# Patient Record
Sex: MALE | Race: ASIAN
[De-identification: names, ages, dates, MRNs, and addresses within clinical notes are randomized per-mention and may not be internally consistent; named-entity substitution may affect disease eponyms.]

---

## 2019-07-22 ENCOUNTER — HOSPITAL ENCOUNTER (INPATIENT)
Dept: HOSPITAL 72 - EMR | Age: 39
LOS: 2 days | Discharge: HOME | DRG: 750 | End: 2019-07-24
Payer: COMMERCIAL

## 2019-07-22 VITALS — DIASTOLIC BLOOD PRESSURE: 68 MMHG | SYSTOLIC BLOOD PRESSURE: 128 MMHG

## 2019-07-22 VITALS — DIASTOLIC BLOOD PRESSURE: 68 MMHG | SYSTOLIC BLOOD PRESSURE: 121 MMHG

## 2019-07-22 VITALS — DIASTOLIC BLOOD PRESSURE: 89 MMHG | SYSTOLIC BLOOD PRESSURE: 150 MMHG

## 2019-07-22 VITALS — BODY MASS INDEX: 40.43 KG/M2 | HEIGHT: 74 IN | WEIGHT: 315 LBS

## 2019-07-22 VITALS — SYSTOLIC BLOOD PRESSURE: 135 MMHG | DIASTOLIC BLOOD PRESSURE: 88 MMHG

## 2019-07-22 DIAGNOSIS — R79.89: ICD-10-CM

## 2019-07-22 DIAGNOSIS — F20.9: Primary | ICD-10-CM

## 2019-07-22 DIAGNOSIS — E87.6: ICD-10-CM

## 2019-07-22 DIAGNOSIS — E86.0: ICD-10-CM

## 2019-07-22 LAB
ADD MANUAL DIFF: NO
ALBUMIN SERPL-MCNC: 3.7 G/DL (ref 3.4–5)
ALBUMIN/GLOB SERPL: 0.7 {RATIO} (ref 1–2.7)
ALP SERPL-CCNC: 102 U/L (ref 46–116)
ALT SERPL-CCNC: 163 U/L (ref 12–78)
ANION GAP SERPL CALC-SCNC: 10 MMOL/L (ref 5–15)
AST SERPL-CCNC: 115 U/L (ref 15–37)
BASOPHILS NFR BLD AUTO: 0.9 % (ref 0–2)
BILIRUB DIRECT SERPL-MCNC: 0.4 MG/DL (ref 0–0.3)
BILIRUB SERPL-MCNC: 2.5 MG/DL (ref 0.2–1)
BUN SERPL-MCNC: 10 MG/DL (ref 7–18)
CALCIUM SERPL-MCNC: 9.8 MG/DL (ref 8.5–10.1)
CHLORIDE SERPL-SCNC: 103 MMOL/L (ref 98–107)
CO2 SERPL-SCNC: 28 MMOL/L (ref 21–32)
CREAT SERPL-MCNC: 1 MG/DL (ref 0.55–1.3)
EOSINOPHIL NFR BLD AUTO: 4.5 % (ref 0–3)
ERYTHROCYTE [DISTWIDTH] IN BLOOD BY AUTOMATED COUNT: 10.5 % (ref 11.6–14.8)
GLOBULIN SER-MCNC: 5.2 G/DL
HCT VFR BLD CALC: 45.2 % (ref 42–52)
HGB BLD-MCNC: 16 G/DL (ref 14.2–18)
LYMPHOCYTES NFR BLD AUTO: 19.2 % (ref 20–45)
MCV RBC AUTO: 92 FL (ref 80–99)
MONOCYTES NFR BLD AUTO: 7.5 % (ref 1–10)
NEUTROPHILS NFR BLD AUTO: 68 % (ref 45–75)
PLATELET # BLD: 333 K/UL (ref 150–450)
POTASSIUM SERPL-SCNC: 3 MMOL/L (ref 3.5–5.1)
RBC # BLD AUTO: 4.93 M/UL (ref 4.7–6.1)
SODIUM SERPL-SCNC: 140 MMOL/L (ref 136–145)
WBC # BLD AUTO: 13.4 K/UL (ref 4.8–10.8)

## 2019-07-22 PROCEDURE — 86803 HEPATITIS C AB TEST: CPT

## 2019-07-22 PROCEDURE — 85025 COMPLETE CBC W/AUTO DIFF WBC: CPT

## 2019-07-22 PROCEDURE — 80053 COMPREHEN METABOLIC PANEL: CPT

## 2019-07-22 PROCEDURE — 80076 HEPATIC FUNCTION PANEL: CPT

## 2019-07-22 PROCEDURE — 87340 HEPATITIS B SURFACE AG IA: CPT

## 2019-07-22 PROCEDURE — 71045 X-RAY EXAM CHEST 1 VIEW: CPT

## 2019-07-22 PROCEDURE — 80048 BASIC METABOLIC PNL TOTAL CA: CPT

## 2019-07-22 PROCEDURE — 86709 HEPATITIS A IGM ANTIBODY: CPT

## 2019-07-22 PROCEDURE — 80307 DRUG TEST PRSMV CHEM ANLYZR: CPT

## 2019-07-22 PROCEDURE — 82248 BILIRUBIN DIRECT: CPT

## 2019-07-22 PROCEDURE — 99285 EMERGENCY DEPT VISIT HI MDM: CPT

## 2019-07-22 PROCEDURE — 96365 THER/PROPH/DIAG IV INF INIT: CPT

## 2019-07-22 PROCEDURE — 36415 COLL VENOUS BLD VENIPUNCTURE: CPT

## 2019-07-22 PROCEDURE — 80329 ANALGESICS NON-OPIOID 1 OR 2: CPT

## 2019-07-22 PROCEDURE — 87081 CULTURE SCREEN ONLY: CPT

## 2019-07-22 PROCEDURE — 86705 HEP B CORE ANTIBODY IGM: CPT

## 2019-07-22 RX ADMIN — TRAZODONE HYDROCHLORIDE SCH MG: 100 TABLET ORAL at 21:00

## 2019-07-22 NOTE — NUR
ED Nurse Note:



ivf started per ermd ordered. will continue to monitor. pt is in room, asleep, 
easily arousable.

## 2019-07-22 NOTE — EMERGENCY ROOM REPORT
History of Present Illness


General


Chief Complaint:  General Complaint


Source:  Patient, EMS, Law Enforcement





Present Illness


HPI


39-year-old male presents ED for evaluation.  Brought in by EMS and [employer].  Was 

found wandering the streets.  Bystanders called 911.  Patient has been missing 

from his group home for the last 10 days.  Director of home stated that patient 

needs to be brought to hospital for medical clearance and evaluation.  Patient 

states he feels okay.  History of schizophrenia.  Has not been taking his 

medications for the last 10 days.  Denies SI or HI.  Denies hearing voices.  No 

other aggravating relieving factors.  Denies any other associated symptoms


Allergies:  


Coded Allergies:  


     No Known Allergies (Unverified , 7/22/19)





Patient History


Past Medical History:  psych hx


Past Surgical History:  none


Pertinent Family History:  none


Social History:  Denies: smoking, alcohol use, drug use


Immunizations:  UTD


Reviewed Nursing Documentation:  PMH: Agreed; PSxH: Agreed





Review of Systems


All Other Systems:  negative except mentioned in HPI





Physical Exam





Vital Signs








  Date Time  Temp Pulse Resp B/P (MAP) Pulse Ox O2 Delivery O2 Flow Rate FiO2


 


7/22/19 15:00 99.9 71 19 150/89 (109) 97 Room Air  








Sp02 EP Interpretation:  reviewed, normal


General Appearance:  no apparent distress, alert, GCS 15, non-toxic, obese


Head:  normocephalic, atraumatic


Eyes:  bilateral eye normal inspection, bilateral eye PERRL


ENT:  hearing grossly normal, normal pharynx, no angioedema, normal voice


Neck:  full range of motion, supple/symm/no masses


Respiratory:  chest non-tender, lungs clear, normal breath sounds, speaking 

full sentences


Cardiovascular #1:  regular rate, rhythm, no edema


Cardiovascular #2:  2+ carotid (R), 2+ carotid (L), 2+ radial (R), 2+ radial (L)

, 2+ dorsalis pedis (R), 2+ dorsalis pedis (L)


Gastrointestinal:  normal bowel sounds, non tender, soft, non-distended, no 

guarding, no rebound


Rectal:  deferred


Genitourinary:  normal inspection, no CVA tenderness


Musculoskeletal:  back normal, gait/station normal, normal range of motion, non-

tender


Neurologic:  alert, oriented x3, responsive, motor strength/tone normal, 

sensory intact, speech normal


Psychiatric:  judgement/insight normal, memory normal, mood/affect normal, no 

suicidal/homicidal ideation


Reflexes:  3+ bicep (R), 3+ bicep (L), 3+ tricep (R), 3+ tricep (L), 3+ knee (R)

, 3+ knee (L)


Lymphatic:  no adenopathy





Medical Decision Making


Diagnostic Impression:  


 Primary Impression:  


 Schizophrenia


 Qualified Codes:  F20.9 - Schizophrenia, unspecified


 Additional Impressions:  


 Elevated LFTs


 Hypokalemia


ER Course


39-year-old male referred to ED for medical clearance.  Missing from his group 

home for 10 days





differentialdehydration, psychosis, EtOH





Placed on stretcher.  After initial history and physical I ordered labs, IV 

fluids





labs No leukocytosis, hemoglobin/hematocrit stable, potassium 3.0, LFTs 

elevated.





K Repleted.  Patient denies any abdominal pain.  No overt signs of jaundice.  

Due to morbid body habitus patient would not be able to get a CT. patient has 

no abdominal pain 





director of facility stated that patient would not be accepted until he was 

cleared medically and evaluated by a psychiatrist.  No indication for transfer 

to psychiatric team.





Will be admitted to Dr. Escobar service.  psychiatry consult will be 

obtained.  





Diagnosisschizophrenia, elevated LFTs, hypokalemia





admitted to floor in serious condition





Labs








Test


  7/22/19


15:10


 


White Blood Count


  13.4 K/UL


(4.8-10.8)


 


Red Blood Count


  4.93 M/UL


(4.70-6.10)


 


Hemoglobin


  16.0 G/DL


(14.2-18.0)


 


Hematocrit


  45.2 %


(42.0-52.0)


 


Mean Corpuscular Volume 92 FL (80-99) 


 


Mean Corpuscular Hemoglobin


  32.5 PG


(27.0-31.0)


 


Mean Corpuscular Hemoglobin


Concent 35.4 G/DL


(32.0-36.0)


 


Red Cell Distribution Width


  10.5 %


(11.6-14.8)


 


Platelet Count


  333 K/UL


(150-450)


 


Mean Platelet Volume


  6.5 FL


(6.5-10.1)


 


Neutrophils (%) (Auto)


  68.0 %


(45.0-75.0)


 


Lymphocytes (%) (Auto)


  19.2 %


(20.0-45.0)


 


Monocytes (%) (Auto)


  7.5 %


(1.0-10.0)


 


Eosinophils (%) (Auto)


  4.5 %


(0.0-3.0)


 


Basophils (%) (Auto)


  0.9 %


(0.0-2.0)


 


Sodium Level


  140 MMOL/L


(136-145)


 


Potassium Level


  3.0 MMOL/L


(3.5-5.1)


 


Chloride Level


  103 MMOL/L


()


 


Carbon Dioxide Level


  28 MMOL/L


(21-32)


 


Anion Gap


  10 mmol/L


(5-15)


 


Blood Urea Nitrogen


  10 mg/dL


(7-18)


 


Creatinine


  1.0 MG/DL


(0.55-1.30)


 


Estimat Glomerular Filtration


Rate > 60 mL/min


(>60)


 


Glucose Level


  114 MG/DL


()


 


Calcium Level


  9.8 MG/DL


(8.5-10.1)


 


Total Bilirubin


  2.5 MG/DL


(0.2-1.0)


 


Direct Bilirubin


  0.4 MG/DL


(0.0-0.3)


 


Aspartate Amino Transf


(AST/SGOT) 115 U/L


(15-37)


 


Alanine Aminotransferase


(ALT/SGPT) 163 U/L


(12-78)


 


Alkaline Phosphatase


  102 U/L


()


 


Total Protein


  8.9 G/DL


(6.4-8.2)


 


Albumin


  3.7 G/DL


(3.4-5.0)


 


Globulin 5.2 g/dL 


 


Albumin/Globulin Ratio 0.7 (1.0-2.7) 


 


Salicylates Level


  < 0.2 ug/mL


(2.8-20)


 


Urine Opiates Screen


  Negative


(NEGATIVE)


 


Acetaminophen Level


  < 2 MCG/ML


(10-30)


 


Urine Barbiturates Screen


  Negative


(NEGATIVE)


 


Phencyclidine (PCP) Screen


  Negative


(NEGATIVE)


 


Urine Amphetamines Screen


  Negative


(NEGATIVE)


 


Urine Benzodiazepines Screen


  Negative


(NEGATIVE)


 


Urine Cocaine Screen


  Negative


(NEGATIVE)


 


Urine Marijuana (THC) Screen


  Negative


(NEGATIVE)


 


Serum Alcohol < 3 mg/dL 











Last Vital Signs








  Date Time  Temp Pulse Resp B/P (MAP) Pulse Ox O2 Delivery O2 Flow Rate FiO2


 


7/22/19 15:25  71 19   Room Air  


 


7/22/19 15:25 99.9   150/89 97   








Status:  improved


Disposition:  ADMITTED AS INPATIENT


Condition:  Serious


Referrals:  


NON PHYSICIAN (PCP)











Hayden Jackson MD Jul 22, 2019 17:58

## 2019-07-22 NOTE — NUR
NURSE NOTES:Patient Assisted and take  shower  tonight . And assisted back to bed . safety 
and fall precautions . Patient instructed to uses call light when needed . Patient 
verbalized his understanding bed in low position . bed alarm on will continue  to monitor.

## 2019-07-22 NOTE — NUR
ED Nurse Note:



pt was transfered to 320 1 with stable vs, all belongings endorsed to jacinto delarosa.

## 2019-07-22 NOTE — NUR
ED Nurse Note:

Pt was reported as missing person 2 wks ago and was found by IDT Team that pt 
is wandering around in the area.  Tyree Branch came to er 
with the pt, albaro and cristina guaman #[id number]. Pt is here for medical claerance.

## 2019-07-22 NOTE — NUR
NURSE NOTES:

Received pt from ED @1820, report given by Emily SHAFER. Pt awake, A/O x 2, calm, cooperative, 
homeless, overweight.  VS stable, denies pain, no SOB noted. Redness abd folds/ groin area, 
scratches on gen body, closed blister on right great toe. IVF infusing. k replaced per 
order. tolerating diet. pt dont remember home meds. belongings with pt. Swabs pending. 
received orders. bed in low position, bed alarm on. call light within reach. will continue 
to monitor.

## 2019-07-22 NOTE — NUR
NURSE NOTES:Patient refused  scheduled Trazodone 100MG PO at 21:00 pm.  Explained to patient 
the risk and benefits . patient states  he feels okay. and still refusing April VANESSA 
Notified and aware.

## 2019-07-22 NOTE — NUR
NURSE NOTES:NURSE NOTES Patient received from Connie GARG Patient denies any pain at this 
time . nos/s of distress noted . LAC G# 20  ns at 100 cc/ hr infusing well . safety / fall 
precaution . Call lihnt within reach . Bed in low position . will continue to monitor .

## 2019-07-23 VITALS — SYSTOLIC BLOOD PRESSURE: 105 MMHG | DIASTOLIC BLOOD PRESSURE: 62 MMHG

## 2019-07-23 VITALS — DIASTOLIC BLOOD PRESSURE: 97 MMHG | SYSTOLIC BLOOD PRESSURE: 149 MMHG

## 2019-07-23 VITALS — SYSTOLIC BLOOD PRESSURE: 131 MMHG | DIASTOLIC BLOOD PRESSURE: 64 MMHG

## 2019-07-23 VITALS — DIASTOLIC BLOOD PRESSURE: 84 MMHG | SYSTOLIC BLOOD PRESSURE: 133 MMHG

## 2019-07-23 VITALS — SYSTOLIC BLOOD PRESSURE: 122 MMHG | DIASTOLIC BLOOD PRESSURE: 84 MMHG

## 2019-07-23 VITALS — SYSTOLIC BLOOD PRESSURE: 129 MMHG | DIASTOLIC BLOOD PRESSURE: 62 MMHG

## 2019-07-23 RX ADMIN — ARIPIPRAZOLE SCH MG: 10 TABLET ORAL at 08:31

## 2019-07-23 RX ADMIN — BENZTROPINE MESYLATE SCH MG: 1 TABLET ORAL at 08:32

## 2019-07-23 RX ADMIN — TRAZODONE HYDROCHLORIDE SCH MG: 100 TABLET ORAL at 20:47

## 2019-07-23 NOTE — NUR
NURSE NOTES Patient  refusing blood works this morning 7/23/19 . Explained the risk and 
benefits . Patient still refusing  Julia VANESSA notified and aware. will continue to monitor .

## 2019-07-23 NOTE — NUR
NURSE NOTES:

Patient refused blood draw for stat order Hep panel 3 attempt done patient refused and got 
aggressive on the third attempt per phlebotomist Benita.

## 2019-07-23 NOTE — HISTORY AND PHYSICAL REPORT
DATE OF ADMISSION:  07/22/2019

REASON FOR ADMISSION:  Homelessness.



HISTORY OF PRESENT ILLNESS:  The patient is a 39-year-old gentleman brought

to the emergency room by EMS and [employer] after he was found wandering the

streets.  Bystanders had called 911.  The patient had been missing from

his group home for approximately 10 days.  _____ at home stated he needed

to be brought to the hospital for medical clearance and evaluation prior

to be returned to the facility.  The patient does have a history of

schizophrenia and has not been taking his medications.



PAST MEDICAL HISTORY:  Schizophrenia.



PAST SURGICAL HISTORY:  None.



ALLERGIES:  No known drug allergies.



SOCIAL HISTORY:  No tobacco, alcohol, or illicit drug use.



REVIEW OF SYSTEMS:  NEUROLOGIC:  The patient denies headache, change in

vision, syncope,  or presyncopal episodes.  CARDIOVASCULAR:  No current

chest pain, palpitations, or angina.  PULMONARY:  No difficulty breathing,

productive cough, or sputum.  GASTROINTESTINAL/GENITOURINARY:  No change

in urine or bowel habits.  No nausea, vomiting, or diarrhea.

ENDOCRINOLOGY:  No night sweats, fevers, or chills.  MUSCULOSKELETAL:  The

patient is feeling weak, tired, and fatigued.



PHYSICAL EXAMINATION:

VITAL SIGNS:  Blood pressure 121/68, respiratory rate 20, pulse 78,

temperature 97.9, and 95% oxygen saturation on room air.

GENERAL:  The patient is awake, alert, and not in distress.

HEENT:  Extraocular muscles intact.  No lymphadenopathy noted.

Oropharyngeal mucosa is clear and dry.

CARDIOVASCULAR:  S1, S2.  No rubs or gallops.  Regular rate.

PULMONARY:  Clear to auscultation bilaterally.  No rales, rhonchi or

wheezes.

ABDOMINAL:  Nondistended and nontender.

EXTREMITY:  A 1+ edema with blisters noted.



LABORATORY DATA:  Labs dated 07/22/2019, white cell count 13.4, hemoglobin

16, and platelet count 333,000.  Sodium 140, potassium 3, and creatinine

1.  AST and ALT are 115 and 163 respectively.  Albumin 3.7.  Toxicology

screen negative.



ASSESSMENT AND PLAN:

1. Prior schizophrenia, not being on any medications at this time.  The

patient is not a harm to himself.  He has no suicidal ideation or plans on

hurting himself.  At this time, Psychiatry has also been called.  We will

medically clear the patient and transfer him back to his living home.

Social Work consult has been placed.

2. Schizophrenia.  At this time, the patient is currently stable.

Psychiatry has been consulted for further evaluation and management and

re-initiation of his medications.

3. DVT prophylaxis with SCDs.









  ______________________________________________

  Jimbo Nelson MD





DR:  KRYSTAL

D:  07/22/2019 20:35

T:  07/23/2019 01:04

JOB#:  [id number]

CC:

## 2019-07-23 NOTE — NUR
CASE MANAGEMENT:REVIEW



39 YR OLD MALE BIBA FROM [facility]



SI: ENCEPHALOPATHY. HYPOKALEMIA

SCHIZOPHRENIA

99.8  71  19  150/89    97% ON RA

WBC+13.4   K-3.0   GLUCOSE+114



IS: K-DUR PO X1

IVF+KCL@100/HR

**: TO MED/SURG UNIT



7/23/19

DISCHARGE

## 2019-07-23 NOTE — DIAGNOSTIC IMAGING REPORT
Indication: Cough, employee health screening

 

Technique: One view of the chest

 

Comparison: none

 

Findings: Body habitus limits evaluation. The heart is enlarged. No definite acute

infiltrates, effusions, or congestion.

 

Impression: Cardiac megaly

 

No acute process

## 2019-07-23 NOTE — NUR
*-* INSURANCE *-*



ALL CLINICALS AND REVIEWS HAVE BEEN FAXED:



[facility] 

FAX ALL CLINICALS  [phone]

## 2019-07-23 NOTE — NUR
NURSE NOTES:

INCONTINENT OF BM. ASSISTED IN THE SHOWER.

WITH REDNESS ON THE ABDOMINAL FOLDS AND GROIN. 

SEEN BY WOUND CARE NURSE. APPLIED TRIAD CREAM TO ABD. FOLDS 

AND GROIN AS PER WOUND CARE RECOMMENDATION.

## 2019-07-23 NOTE — NUR
NURSE NOTES:

Received report from HOLLIS Denis. Patient A&Ox4. On room air, no signs of distress or labored 
breathing. IV intact, patent, and infusing IV fluids. Bed in lowest position with call light 
in reach. Patient awaiting discharge to [facility]. Will continue to 
monitor. 

-------------------------------------------------------------------------------

Addendum: 07/23/19 at 2107 by Idalia Rosado RN

-------------------------------------------------------------------------------

Patient is going to [facility] in [place], [place]

## 2019-07-23 NOTE — NUR
Social Service Note



EVETTE spoke with  Tyree Branch of [facility] [phone].  Patient is not 
homeless.  Patient has been a resident of facility for 8 years.  Patient is not conserved.  
Since patient has been missing for almost 2 weeks facility requires a chest x-ray and hep 
panel to be completed.  EVETTE informed charge nurse.  Once labs and chest x-ray completed 
information faxed to [phone].  [facility] states they are available to provide 
transportation back to facility.  Patient last wandered from facility a year ago.  Will 
monitor and follow up.

## 2019-07-23 NOTE — NUR
NURSE NOTES:

CN spoke to  Robyn regarding patient  schedule by the facility he used 
to live after 6pm, CN left message for Dr. Nelson to obtain discharge order will f/u

## 2019-07-23 NOTE — NEPHROLOGY PROGRESS NOTE
Assessment/Plan


Assessment/Plan:





A/P





1. Schizophrenia- DC patient today once cleared by PSY and social work address 

home situation


2. Dehydration- IVFs


3. DVT prophylaxis with SCDs.


4. Mild LFT elevation- ?? repeat labs pending





Subjective


Date patient seen:  Jul 23, 2019


Time patient seen:  07:38


ROS Limited/Unobtainable:  No


Allergies:  


Coded Allergies:  


     No Known Allergies (Unverified , 7/22/19)


Subjective


Patient calm in no distress





Objective





Last 24 Hour Vital Signs








  Date Time  Temp Pulse Resp B/P (MAP) Pulse Ox O2 Delivery O2 Flow Rate FiO2


 


7/23/19 04:00 98.6 111 21 122/84 (97)    


 


7/23/19 00:00 98.5 118 21 131/64 (86) 97   


 


7/22/19 22:00      Room Air  


 


7/22/19 21:00      Room Air  


 


7/22/19 20:00 98.4 125 20 128/68 (88) 97   


 


7/22/19 20:00        


 


7/22/19 18:23 97.9 120 20 121/68 (85) 95   


 


7/22/19 18:05 98.0 78 14 135/88 94   


 


7/22/19 18:05 98.0 88 14 135/88 94 Room Air  


 


7/22/19 15:25  71 19   Room Air  


 


7/22/19 15:25 99.9 71 19 150/89 97 Room Air  


 


7/22/19 15:00 99.9 71 19 150/89 (109) 97 Room Air  

















Intake and Output  


 


 7/22/19 7/23/19





 19:00 07:00


 


Intake Total 100 ml 1860 ml


 


Output Total  450 ml


 


Balance 100 ml 1410 ml


 


  


 


Intake Oral  760 ml


 


IV Total 100 ml 1100 ml


 


Output Urine Total  450 ml


 


# Voids 1 4








Laboratory Tests


7/22/19 15:10: 


White Blood Count 13.4H, Red Blood Count 4.93, Hemoglobin 16.0, Hematocrit 45.2

, Mean Corpuscular Volume 92, Mean Corpuscular Hemoglobin 32.5H, Mean 

Corpuscular Hemoglobin Concent 35.4, Red Cell Distribution Width 10.5L, 

Platelet Count 333, Mean Platelet Volume 6.5, Neutrophils (%) (Auto) 68.0, 

Lymphocytes (%) (Auto) 19.2L, Monocytes (%) (Auto) 7.5, Eosinophils (%) (Auto) 

4.5H, Basophils (%) (Auto) 0.9, Sodium Level 140, Potassium Level 3.0L, 

Chloride Level 103, Carbon Dioxide Level 28, Anion Gap 10, Blood Urea Nitrogen 

10, Creatinine 1.0, Estimat Glomerular Filtration Rate > 60, Glucose Level 114H

, Calcium Level 9.8, Total Bilirubin 2.5H, Direct Bilirubin 0.4H, Aspartate 

Amino Transf (AST/SGOT) 115H, Alanine Aminotransferase (ALT/SGPT) 163H, 

Alkaline Phosphatase 102, Total Protein 8.9H, Albumin 3.7, Globulin 5.2, Albumin

/Globulin Ratio 0.7L, Salicylates Level < 0.2L, Urine Opiates Screen Negative, 

Acetaminophen Level < 2L, Urine Barbiturates Screen Negative, Phencyclidine (PCP

) Screen Negative, Urine Amphetamines Screen Negative, Urine Benzodiazepines 

Screen Negative, Urine Cocaine Screen Negative, Urine Marijuana (THC) Screen 

Negative, Serum Alcohol < 3


Height (Feet):  6


Height (Inches):  2.00


Weight (Pounds):  441


General Appearance:  no apparent distress


EENT:  normal ENT inspection


Neck:  normal alignment, supple


Cardiovascular:  normal rate, regular rhythm


Respiratory/Chest:  lungs clear, normal breath sounds


Abdomen:  non tender, soft


Edema:  1+ Arm (L), 1+ Arm (R), 1+ Leg (L), 1+ Leg (R), 1+ Pedal (L), 1+ Pedal (

R), 1+ Generalized











Jimbo Nelson MD Jul 23, 2019 07:40

## 2019-07-23 NOTE — NUR
Social Service Note



SW spoke with Tyree [phone] and confirmed facility with pick patient up after 6pm.  
They will also bring patient a change of clothes. Primary nurse aware.

## 2019-07-24 VITALS — SYSTOLIC BLOOD PRESSURE: 134 MMHG | DIASTOLIC BLOOD PRESSURE: 100 MMHG

## 2019-07-24 VITALS — DIASTOLIC BLOOD PRESSURE: 80 MMHG | SYSTOLIC BLOOD PRESSURE: 160 MMHG

## 2019-07-24 VITALS — DIASTOLIC BLOOD PRESSURE: 70 MMHG | SYSTOLIC BLOOD PRESSURE: 127 MMHG

## 2019-07-24 VITALS — SYSTOLIC BLOOD PRESSURE: 139 MMHG | DIASTOLIC BLOOD PRESSURE: 97 MMHG

## 2019-07-24 LAB
ADD MANUAL DIFF: NO
ALBUMIN SERPL-MCNC: 3.2 G/DL (ref 3.4–5)
ALP SERPL-CCNC: 84 U/L (ref 46–116)
ALT SERPL-CCNC: 133 U/L (ref 12–78)
ANION GAP SERPL CALC-SCNC: 10 MMOL/L (ref 5–15)
AST SERPL-CCNC: 112 U/L (ref 15–37)
BASOPHILS NFR BLD AUTO: 1 % (ref 0–2)
BILIRUB DIRECT SERPL-MCNC: 0.3 MG/DL (ref 0–0.3)
BILIRUB SERPL-MCNC: 1.1 MG/DL (ref 0.2–1)
BUN SERPL-MCNC: 7 MG/DL (ref 7–18)
CALCIUM SERPL-MCNC: 9.1 MG/DL (ref 8.5–10.1)
CHLORIDE SERPL-SCNC: 104 MMOL/L (ref 98–107)
CO2 SERPL-SCNC: 27 MMOL/L (ref 21–32)
CREAT SERPL-MCNC: 0.9 MG/DL (ref 0.55–1.3)
EOSINOPHIL NFR BLD AUTO: 6.3 % (ref 0–3)
ERYTHROCYTE [DISTWIDTH] IN BLOOD BY AUTOMATED COUNT: 11 % (ref 11.6–14.8)
HCT VFR BLD CALC: 45.4 % (ref 42–52)
HGB BLD-MCNC: 15.7 G/DL (ref 14.2–18)
LYMPHOCYTES NFR BLD AUTO: 28.5 % (ref 20–45)
MCV RBC AUTO: 93 FL (ref 80–99)
MONOCYTES NFR BLD AUTO: 3.3 % (ref 1–10)
NEUTROPHILS NFR BLD AUTO: 60.9 % (ref 45–75)
PLATELET # BLD: 293 K/UL (ref 150–450)
POTASSIUM SERPL-SCNC: 3.4 MMOL/L (ref 3.5–5.1)
RBC # BLD AUTO: 4.88 M/UL (ref 4.7–6.1)
SODIUM SERPL-SCNC: 141 MMOL/L (ref 136–145)
WBC # BLD AUTO: 11.1 K/UL (ref 4.8–10.8)

## 2019-07-24 RX ADMIN — ARIPIPRAZOLE SCH MG: 10 TABLET ORAL at 08:05

## 2019-07-24 RX ADMIN — BENZTROPINE MESYLATE SCH MG: 1 TABLET ORAL at 08:05

## 2019-07-24 NOTE — NEPHROLOGY PROGRESS NOTE
Assessment/Plan


Assessment/Plan:





A/P





1. Schizophrenia- DC patient today pending


2. Dehydration-DC IVFs


3. DVT prophylaxis with SCDs.


4. Mild LFT elevation- ?? repeat labs pending


     - patient declined any further labs or evaluation





Subjective


Date patient seen:  Jul 24, 2019


Time patient seen:  09:16


ROS Limited/Unobtainable:  No


Allergies:  


Coded Allergies:  


     No Known Allergies (Unverified , 7/22/19)


Subjective


Patient awaiting DC today to [facility] care





Objective





Last 24 Hour Vital Signs








  Date Time  Temp Pulse Resp B/P (MAP) Pulse Ox O2 Delivery O2 Flow Rate FiO2


 


7/24/19 08:39      Room Air  


 


7/24/19 08:00  105 20 139/97 (111) 95   





  105      


 


7/24/19 04:00 98.0 111 18 134/100 (111) 96   


 


7/24/19 00:00 98.0 103 18 160/80 (106) 95   


 


7/23/19 21:00      Room Air  


 


7/23/19 20:00 98.2 111 18 133/84 (100) 95   


 


7/23/19 16:04 97.2 92 20 105/62 (76) 98   


 


7/23/19 12:00 97.5 118 18 129/62 (84) 97   

















Intake and Output  


 


 7/23/19 7/24/19





 19:00 07:00


 


Intake Total 1750 ml 1150 ml


 


Balance 1750 ml 1150 ml


 


  


 


Intake Oral 650 ml 


 


IV Total 1100 ml 1150 ml


 


# Voids 2 








Height (Feet):  6


Height (Inches):  2.00


Weight (Pounds):  434


General Appearance:  no apparent distress


EENT:  normal ENT inspection


Neck:  normal alignment, supple


Cardiovascular:  normal rate, regular rhythm


Respiratory/Chest:  lungs clear, normal breath sounds


Abdomen:  non tender, soft


Edema:  no edema noted Arm (L), no edema noted Arm (R), no edema noted Leg (L), 

no edema noted Leg (R), no edema noted Pedal (L), no edema noted Pedal (R), no 

edema noted Generalized











Jimbo Nelson MD Jul 24, 2019 09:18

## 2019-07-24 NOTE — NUR
*-* INSURANCE *-*



UPDATED CLINICALS AND REVIEWS HAVE BEEN FAXED:



[facility] 

FAX ALL CLINICALS  [phone]

## 2019-07-24 NOTE — CONSULTATION
DATE OF CONSULTATION:  07/24/2019

CONSULTING PHYSICIAN:  Dinh Bates M.D.



HISTORY OF PRESENT ILLNESS:  This is a 39-year-old male with a history of

schizophrenia, who has been admitted to the hospital after the police

brought him to the emergency room.  He was found wandering in the streets.

The patient was a poor historian.  During the evaluation, he replied "I

don't know to most of the questions."  The patient has poor insight and

judgment into his current medical condition.  The patient is delusional.

The patient has poor insight and judgment.



PAST PSYCHIATRIC HISTORY:  Schizophrenia.



PAST MEDICAL HISTORY:  Significant for obesity.



MENTAL STATUS EXAMINATION:  The patient is alert and oriented times to self

and place.  Mood is dysphoric.  Affect is constricted, congruent with

mood.  Thought process is concrete.  Thought content, no suicidal or

homicidal ideation.  The patient was able to answer the questions

appropriately.  No suicidal or homicidal ideation.  No delusions.



ASSESSMENT:

Axis I  Schizophrenia.

Axis II  Deferred.

Axis III  As above.

Axis IV  Low.

Axis V  20.



PLAN:

1. The patient will be started on risperidone 2 mg by mouth nightly.  We

will discontinue the Abilify.

2. The patient is not an imminent danger to self or others.

3. We discussed the case with Dr. Nelson.









  ______________________________________________

  Dinh Bates M.D.





DR:  GLEN

D:  07/24/2019 17:10

T:  07/24/2019 20:39

JOB#:  [id number]

CC:

## 2019-07-24 NOTE — NUR
NURSE NOTES:



ASSISTED IN THE SHOWER. TOLERATED. 

TRIAD CREAM APPLIED TO ABDOMINAL FOLDS AND GROIN REDNESS.

## 2019-07-24 NOTE — NUR
NURSE NOTES:



CONTACTED VIANEY KHAN (ADMINISTRATOR0 WHO WILL BE PICKING UP PT. STATES HE'S IN COURT RT NOW 
AND WILL  PT AT 2PM.

## 2019-07-24 NOTE — DISCHARGE SUMMARY
Discharge Summary


Discharge Summary


_


DATE OF ADMISSION: 7/22/2019





DATE OF DISCHARGE: 7/24/2019





DISCHARGED BY: Dr. Nelson





REASON FOR ADMISSION: 


39 years old male with past medical history of schizophrenia, brought to 

emergency room by EMS and [employer] after he was found wandering on the streetS.  


Bystander had called 911.  


The patient had been missing from his group home for approximately 10 days.  


Patient WAS required to be brought to the hospital for medical clearance and 

evaluation prior to return to the facility.  


Patient apparently was not taking his psych medications.


Laboratory work-up revealed WBC 13.4, stable hemoglobin  AND hematocrit.  


Potassium 3.0, stable renal parameters.  


, .  


Urine toxicology screen was negative.  


Serum alcohol, Tylenol, and salicylate level were all negative.


Patient admitted to medical surgical floor.


 


CONSULTANTS: 


Psychiatrist 


 


[facility] COURSE: 


Patient admitted to medical surgical floor.  


Patient started on the IV fluids. 


Psychiatric medication resumed.  


GI prophylaxis provided.  


Potassium was replaced.


Renal parameters and electrolytes were closely monitored,  nephrotoxins were 

avoided.  


Potassium 3.4 prior to discharge. 


LFT were closely monitored:  total bilirubin from 2.5 down to 1.1 , ALT from 

163 down to 133 , AST from 115 down to 112.  


Hepatitis panel was negative.  


No complaints of abdominal pain.





Supportive care provided.


DVT prophylaxis with SCD provided.


Patient declined any further labs or evaluation  for elevated LFT.  





Patient was recommended to follow-up with his primary care provider as 

outpatient for further  follow-up on LFT.  


Mild leukocytosis trended down to 11.1 , no fevers.  No clinical evidence of 

infection. 





Psychiatrist seen and evaluated patient.  


Psychiatric medication regimen optimized.  


Per psychiatrist. patient was not at imminent danger to self or other.


Psychiatrist cleared patient for discharge.





Patient was medically stable  for discharge to group home.





FINAL DIAGNOSES: 


Schizophrenia


Dehydration


Elevated LFT


Hypokalemia





DISCHARGE MEDICATIONS:


See Medication Reconciliation list.





DISCHARGE INSTRUCTIONS:


Patient was discharged to group home.


Follow-up with primary care provider in 1 week








I have been assigned to dictate discharge summary for this account.


I was not involved in the patient's management.











Tammi Mcleod NP Jul 24, 2019 20:58